# Patient Record
Sex: FEMALE | Race: WHITE | NOT HISPANIC OR LATINO | Employment: OTHER | URBAN - METROPOLITAN AREA
[De-identification: names, ages, dates, MRNs, and addresses within clinical notes are randomized per-mention and may not be internally consistent; named-entity substitution may affect disease eponyms.]

---

## 2020-03-13 ENCOUNTER — HOSPITAL ENCOUNTER (EMERGENCY)
Facility: HOSPITAL | Age: 50
Discharge: HOME | End: 2020-03-13
Attending: EMERGENCY MEDICINE

## 2020-03-13 VITALS
HEART RATE: 78 BPM | SYSTOLIC BLOOD PRESSURE: 122 MMHG | WEIGHT: 160 LBS | BODY MASS INDEX: 25.71 KG/M2 | TEMPERATURE: 98 F | HEIGHT: 66 IN | OXYGEN SATURATION: 99 % | RESPIRATION RATE: 20 BRPM | DIASTOLIC BLOOD PRESSURE: 65 MMHG

## 2020-03-13 DIAGNOSIS — R00.2 PALPITATIONS: ICD-10-CM

## 2020-03-13 DIAGNOSIS — R07.9 CHEST PAIN: ICD-10-CM

## 2020-03-13 DIAGNOSIS — R42 DIZZINESS: ICD-10-CM

## 2020-03-13 DIAGNOSIS — E87.5 HYPERKALEMIA: Primary | ICD-10-CM

## 2020-03-13 LAB
ALBUMIN SERPL BCP-MCNC: 4 G/DL (ref 3.5–5.2)
ALP SERPL-CCNC: 77 U/L (ref 55–135)
ALT SERPL W/O P-5'-P-CCNC: 17 U/L (ref 10–44)
ANION GAP SERPL CALC-SCNC: 13 MMOL/L (ref 8–16)
AST SERPL-CCNC: 17 U/L (ref 10–40)
BASOPHILS # BLD AUTO: 0.02 K/UL (ref 0–0.2)
BASOPHILS NFR BLD: 0.3 % (ref 0–1.9)
BILIRUB SERPL-MCNC: 0.6 MG/DL (ref 0.1–1)
BNP SERPL-MCNC: 10 PG/ML (ref 0–99)
BUN SERPL-MCNC: 12 MG/DL (ref 6–20)
CALCIUM SERPL-MCNC: 9.7 MG/DL (ref 8.7–10.5)
CHLORIDE SERPL-SCNC: 105 MMOL/L (ref 95–110)
CO2 SERPL-SCNC: 21 MMOL/L (ref 23–29)
CREAT SERPL-MCNC: 0.7 MG/DL (ref 0.5–1.4)
DIFFERENTIAL METHOD: ABNORMAL
EOSINOPHIL # BLD AUTO: 0.1 K/UL (ref 0–0.5)
EOSINOPHIL NFR BLD: 0.8 % (ref 0–8)
ERYTHROCYTE [DISTWIDTH] IN BLOOD BY AUTOMATED COUNT: 13.1 % (ref 11.5–14.5)
EST. GFR  (AFRICAN AMERICAN): >60 ML/MIN/1.73 M^2
EST. GFR  (NON AFRICAN AMERICAN): >60 ML/MIN/1.73 M^2
GLUCOSE SERPL-MCNC: 79 MG/DL (ref 70–110)
HCT VFR BLD AUTO: 44.7 % (ref 37–48.5)
HGB BLD-MCNC: 14.7 G/DL (ref 12–16)
IMM GRANULOCYTES # BLD AUTO: 0.01 K/UL (ref 0–0.04)
IMM GRANULOCYTES NFR BLD AUTO: 0.2 % (ref 0–0.5)
LYMPHOCYTES # BLD AUTO: 1.5 K/UL (ref 1–4.8)
LYMPHOCYTES NFR BLD: 23.4 % (ref 18–48)
MCH RBC QN AUTO: 32.2 PG (ref 27–31)
MCHC RBC AUTO-ENTMCNC: 32.9 G/DL (ref 32–36)
MCV RBC AUTO: 98 FL (ref 82–98)
MONOCYTES # BLD AUTO: 0.4 K/UL (ref 0.3–1)
MONOCYTES NFR BLD: 6.6 % (ref 4–15)
NEUTROPHILS # BLD AUTO: 4.5 K/UL (ref 1.8–7.7)
NEUTROPHILS NFR BLD: 68.7 % (ref 38–73)
NRBC BLD-RTO: 0 /100 WBC
PLATELET # BLD AUTO: 250 K/UL (ref 150–350)
PMV BLD AUTO: 10.1 FL (ref 9.2–12.9)
POTASSIUM SERPL-SCNC: 5 MMOL/L (ref 3.5–5.1)
PROT SERPL-MCNC: 8 G/DL (ref 6–8.4)
RBC # BLD AUTO: 4.56 M/UL (ref 4–5.4)
SODIUM SERPL-SCNC: 139 MMOL/L (ref 136–145)
TROPONIN I SERPL DL<=0.01 NG/ML-MCNC: <0.006 NG/ML (ref 0–0.03)
WBC # BLD AUTO: 6.55 K/UL (ref 3.9–12.7)

## 2020-03-13 PROCEDURE — 93005 ELECTROCARDIOGRAM TRACING: CPT

## 2020-03-13 PROCEDURE — 83880 ASSAY OF NATRIURETIC PEPTIDE: CPT

## 2020-03-13 PROCEDURE — 85025 COMPLETE CBC W/AUTO DIFF WBC: CPT

## 2020-03-13 PROCEDURE — 25000003 PHARM REV CODE 250: Performed by: EMERGENCY MEDICINE

## 2020-03-13 PROCEDURE — 80053 COMPREHEN METABOLIC PANEL: CPT

## 2020-03-13 PROCEDURE — 84484 ASSAY OF TROPONIN QUANT: CPT

## 2020-03-13 PROCEDURE — 99284 EMERGENCY DEPT VISIT MOD MDM: CPT | Mod: 25

## 2020-03-13 PROCEDURE — 93010 EKG 12-LEAD: ICD-10-PCS | Mod: ,,, | Performed by: INTERNAL MEDICINE

## 2020-03-13 PROCEDURE — 93010 ELECTROCARDIOGRAM REPORT: CPT | Mod: ,,, | Performed by: INTERNAL MEDICINE

## 2020-03-13 RX ORDER — ACETAMINOPHEN 325 MG/1
325 TABLET ORAL EVERY 6 HOURS PRN
COMMUNITY

## 2020-03-13 RX ORDER — ZINC GLUCONATE 13.3 MG
200 LOZENGE ORAL 4 TIMES DAILY
COMMUNITY

## 2020-03-13 RX ORDER — ASPIRIN 325 MG
325 TABLET ORAL
Status: COMPLETED | OUTPATIENT
Start: 2020-03-13 | End: 2020-03-13

## 2020-03-13 RX ADMIN — ASPIRIN 325 MG ORAL TABLET 325 MG: 325 PILL ORAL at 01:03

## 2020-03-13 NOTE — ED PROVIDER NOTES
Encounter Date: 3/13/2020    SCRIBE #1 NOTE: I, Osvaldo Wynne, am scribing for, and in the presence of,  Syed Vasquez MD. I have scribed the following portions of the note - Other sections scribed: HPI, ROS, PE, MDM.       History     Chief Complaint   Patient presents with    Dizziness     Pt c/o dizziness, HA and palpitations x 2 weeks. Denies F/V/D. No distress noted      Yarelis Farias is a 49 y.o. female who presents to the ED for evaluation of dizziness. Patient states.....        Review of patient's allergies indicates:   Allergen Reactions    Codeine     Penicillins      No past medical history on file.  No past surgical history on file.  No family history on file.  Social History     Tobacco Use    Smoking status: Not on file   Substance Use Topics    Alcohol use: Not on file    Drug use: Not on file     Review of Systems    Physical Exam     Initial Vitals [03/13/20 1306]   BP Pulse Resp Temp SpO2   133/80 69 18 98 °F (36.7 °C) 98 %      MAP       --         Physical Exam    ED Course   Procedures  Labs Reviewed - No data to display       Imaging Results    None                                          Clinical Impression:   {Add your Clinical Impression here. If you haven't documented one yet, please pend the note, finalize a Clinical Impression, and refresh your note before signing.:[id number]}

## 2020-03-13 NOTE — ED NOTES
Called lab to check on lab work lacking.  States 8-10 mins still.  Pt upset and ready to go.  Tried to calm and give encouragement just wants to leave now.  VS stable at this time.

## 2020-03-13 NOTE — ED PROVIDER NOTES
Encounter Date: 3/13/2020       History     Chief Complaint   Patient presents with    Dizziness     Pt c/o dizziness, HA and palpitations x 2 weeks. Denies F/V/D. No distress noted      HPI     49-year-old female with no past medical history presents the ED with sensation of palpitations.  Never took her pulse while this was happening.  She says she is not a very active person is not associated with increased activity.  The sensation of palpitations also associated with intermittent dizziness that only lasts for few seconds. She states she senses that it palpitations with chest pressure that have been happening for the last 2 weeks.  No fevers chills nausea vomiting associated.  No shortness of breath, numbness, tingling or radiating pain.     Review of patient's allergies indicates:   Allergen Reactions    Codeine     Penicillins      History reviewed. No pertinent past medical history.  History reviewed. No pertinent surgical history.  History reviewed. No pertinent family history.  Social History     Tobacco Use    Smoking status: Current Every Day Smoker     Packs/day: 1.00     Types: Cigarettes    Smokeless tobacco: Current User   Substance Use Topics    Alcohol use: Not Currently    Drug use: Not on file     Review of Systems   Constitutional: Negative for fever.   HENT: Negative for sore throat.    Respiratory: Negative for shortness of breath and wheezing.    Cardiovascular: Positive for palpitations. Negative for chest pain and leg swelling.   Gastrointestinal: Negative for abdominal pain and nausea.   Genitourinary: Negative for dysuria.   Musculoskeletal: Negative for back pain.   Skin: Negative for rash.   Neurological: Positive for dizziness. Negative for weakness, numbness and headaches.   Hematological: Does not bruise/bleed easily.       Physical Exam     Initial Vitals [03/13/20 1306]   BP Pulse Resp Temp SpO2   133/80 69 18 98 °F (36.7 °C) 98 %      MAP       --         Physical  Exam    Constitutional: She appears well-developed and well-nourished.   HENT:   Head: Normocephalic and atraumatic.   Mouth/Throat: Oropharynx is clear and moist.   Eyes: Conjunctivae are normal. No scleral icterus.   Neck: Normal range of motion. Neck supple.   Cardiovascular: Normal heart sounds and intact distal pulses.   Pulmonary/Chest: Breath sounds normal. No respiratory distress.   Abdominal: Soft. She exhibits no distension. There is no tenderness.   Musculoskeletal: Normal range of motion. She exhibits no edema or tenderness.   Neurological: She is alert and oriented to person, place, and time.   Skin: Skin is warm and dry.   Psychiatric: She has a normal mood and affect.       EKG:    Normal sinus rhythm at a rate of 69  QRS complexes, p waves and T waves appear to have normal morphology  SC and QT intervals are within normal limits   No abnormal T-wave inversions noted  ED Course   Procedures  Labs Reviewed   CBC W/ AUTO DIFFERENTIAL - Abnormal; Notable for the following components:       Result Value    Mean Corpuscular Hemoglobin 32.2 (*)     All other components within normal limits   COMPREHENSIVE METABOLIC PANEL - Abnormal; Notable for the following components:    CO2 21 (*)     All other components within normal limits   TROPONIN I   B-TYPE NATRIURETIC PEPTIDE   TROPONIN I          Imaging Results    None         49-year-old female with no past medical history presents with sensation of palpitations associated with dizziness that self-resolved.  This has been going on for 2 weeks.  My differential includes arrhythmia, ACS, electrolyte imbalance, dehydration, anxiety.  PE also considered but do not suspect as she is not tachycardic, tachypneic, having chest pain or short of breath.  Patient's describes 1 episode 2 weeks ago of numbness around her mouth that resolved but has not happened again sent.  No other stroke-like symptoms.  Labs acquired EKG.  EKG is normal sinus rhythm.  Labs are overall  reassuring.  Curiously her potassium is on the higher side of normal.  Patient states that she does not think she eat high potassium foods but will keep an eye on it.   I feel that 1 troponin should be adequate as she has been having symptoms for 2 weeks, but offered a 2nd troponin.  Patient refused stating that she feels fine and would like to go home. Patient will follow-up with a PCP/[place] information was given.  Also gave a referral for Cardiology as she may be requiring further investigation to evaluate for arrhythmias.                                     Clinical Impression:       ICD-10-CM ICD-9-CM   1. Hyperkalemia E87.5 276.7   2. Chest pain R07.9 786.50   3. Dizziness R42 780.4   4. Palpitations R00.2 785.1             ED Disposition Condition    Discharge Stable        ED Prescriptions     None        Follow-up Information    None                                    Antonia Silva MD  03/13/20 1726       Antonia Silva MD  03/13/20 5033

## 2020-03-13 NOTE — ED TRIAGE NOTES
Came in with c/o dizziness and lightheadedness for several weeks, heart palpitations.  Dr. Silva present.  PCN and Codeine allergy.

## 2020-03-13 NOTE — ED NOTES
IV lock removed, coban applied.  Instructions went over with patient.  Patient just states she is hungry ready to go.  Upset the she had to wait.  Stable at discharge.

## 2020-03-13 NOTE — ED NOTES
Patient placed on continuous cardiac monitor, automatic blood pressure cuff and continuous pulse oximeter. SR 68

## 2022-07-01 ENCOUNTER — CLINICAL SUPPORT (OUTPATIENT)
Dept: SMOKING CESSATION | Facility: CLINIC | Age: 52
End: 2022-07-01
Payer: COMMERCIAL

## 2022-07-01 DIAGNOSIS — F17.200 NICOTINE DEPENDENCE: Primary | ICD-10-CM

## 2022-07-01 PROCEDURE — 90837 PR PSYCHOTHERAPY W/PATIENT, 60 MIN: ICD-10-PCS | Mod: S$GLB,,,

## 2022-07-01 PROCEDURE — 90837 PSYTX W PT 60 MINUTES: CPT | Mod: S$GLB,,,

## 2022-07-01 PROCEDURE — 99999 PR PBB SHADOW E&M-EST. PATIENT-LVL I: ICD-10-PCS | Mod: PBBFAC,,,

## 2022-07-01 PROCEDURE — 99999 PR PBB SHADOW E&M-EST. PATIENT-LVL I: CPT | Mod: PBBFAC,,,

## 2022-07-01 RX ORDER — IBUPROFEN 200 MG
1 TABLET ORAL DAILY
Qty: 14 PATCH | Refills: 0 | Status: SHIPPED | OUTPATIENT
Start: 2022-07-01 | End: 2022-07-13

## 2022-07-01 RX ORDER — MICONAZOLE NITRATE 2 %
2 CREAM (GRAM) TOPICAL
Qty: 100 EACH | Refills: 0 | Status: SHIPPED | OUTPATIENT
Start: 2022-07-01 | End: 2022-07-02

## 2022-07-01 NOTE — Clinical Note
PATIENT PRESENTS FOR INTAKE BEING A NON SMOKER SINCE GOING COLD [place] ON wEDNESDAY. SHE HAS HAD SOME WITHDRAWAL SYMPTOMS, THEREFORE RECOMMEND THE 21MG NICOTINE PATCH DAILY AND THE 2MG NICOTINE GUM, STRATEGIES AND SESSION HANDOUT DISCUSSED. WILL FOLLOW

## 2022-07-13 ENCOUNTER — CLINICAL SUPPORT (OUTPATIENT)
Dept: SMOKING CESSATION | Facility: CLINIC | Age: 52
End: 2022-07-13
Payer: COMMERCIAL

## 2022-07-13 DIAGNOSIS — F17.200 NICOTINE DEPENDENCE: ICD-10-CM

## 2022-07-13 PROCEDURE — 99402 PR PREVENT COUNSEL,INDIV,30 MIN: ICD-10-PCS | Mod: S$GLB,,,

## 2022-07-13 PROCEDURE — 99402 PREV MED CNSL INDIV APPRX 30: CPT | Mod: S$GLB,,,

## 2022-07-13 PROCEDURE — 99999 PR PBB SHADOW E&M-EST. PATIENT-LVL II: CPT | Mod: PBBFAC,,,

## 2022-07-13 PROCEDURE — 99999 PR PBB SHADOW E&M-EST. PATIENT-LVL II: ICD-10-PCS | Mod: PBBFAC,,,

## 2022-07-13 NOTE — PROGRESS NOTES
Individual Follow-Up Form    7/13/2022    Quit Date: June 29    Clinical Status of Patient: Outpatient    Length of Service: 30 minutes    Continuing Medication: no    Other Medications: N/A     Target Symptoms: Withdrawal and medication side effects. The following were  rated moderate (3) to severe (4) on TCRS:  · Moderate (3): 0  · Severe (4): 0    Comments: PATIENT PRESENTS FOR FOLLOW UP AS A NONSMOKER, SHE QUIT SMOKING ON 6/29, SHE WAS UNABLE TO WEAR THE NICOTINE PATCH DUE TO NAUSEA AND DID NOT LIKE THE NICOTINE GUM ,SHE IS DETERMINED TO QUIT SMOKING, STRATEGIES AND SESSION HANDOUT DISCUSSED, ENCOURAGEMENT PROVIDED, WILL FOLLOW PRN    Diagnosis: F17.200    Next Visit: 2 weeks

## 2022-07-13 NOTE — Clinical Note
Comments: PATIENT PRESENTS FOR FOLLOW UP AS A NONSMOKER, SHE QUIT SMOKING ON 6/29, SHE WAS UNABLE TO WEAR THE NICOTINE PATCH DUE TO NAUSEA AND DID NOT LIKE THE NICOTINE GUM ,SHE IS DETERMINED TO QUIT SMOKING, STRATEGIES AND SESSION HANDOUT DISCUSSED, ENCOURAGEMENT PROVIDED, WILL FOLLOW PRN

## 2022-07-27 ENCOUNTER — CLINICAL SUPPORT (OUTPATIENT)
Dept: SMOKING CESSATION | Facility: CLINIC | Age: 52
End: 2022-07-27
Payer: COMMERCIAL

## 2022-07-27 DIAGNOSIS — F17.200 NICOTINE DEPENDENCE: Primary | ICD-10-CM

## 2022-07-27 PROCEDURE — 99999 PR PBB SHADOW E&M-EST. PATIENT-LVL II: CPT | Mod: PBBFAC,,,

## 2022-07-27 PROCEDURE — 99402 PR PREVENT COUNSEL,INDIV,30 MIN: ICD-10-PCS | Mod: S$GLB,,,

## 2022-07-27 PROCEDURE — 99402 PREV MED CNSL INDIV APPRX 30: CPT | Mod: S$GLB,,,

## 2022-07-27 PROCEDURE — 99999 PR PBB SHADOW E&M-EST. PATIENT-LVL II: ICD-10-PCS | Mod: PBBFAC,,,

## 2022-07-27 NOTE — Clinical Note
Comments: PATIENT PRESENTS FOR FOLLOW UP AS A CONTINUED NON SMOKER,  SHE QUIT SMOKING ON 6/29 AFTER HER SCC[facility], PRESCRIBED TOBACCO CESSATION NRT HOWEVER PATIENT HAS NEVER USED. SHE WAS MENTALLY DETERMINED WHEN SHE WALKED IN TO THE OFFICE THAT SHE WOULD BE QUITTING SMOKING AND NOT RETURNING TO IT. ENCOURAGED HER TO CONTINUE THIS DETERMINATION, STRATEGIES TO PREVENT LAPSE DISCUSSED, WILL FOLLOW

## 2022-07-27 NOTE — PROGRESS NOTES
Individual Follow-Up Form    7/27/2022    Quit Date: 6/29    Clinical Status of Patient: Outpatient    Length of Service: 30 minutes    Continuing Medication: no    Other Medications: N/A     Target Symptoms: Withdrawal and medication side effects. The following were  rated moderate (3) to severe (4) on TCRS:  · Moderate (3): 0  · Severe (4): 0    Comments: PATIENT PRESENTS FOR FOLLOW UP AS A CONTINUED NON SMOKER,  SHE QUIT SMOKING ON 6/29 AFTER HER SCCON, PRESCRIBED TOBACCO CESSATION NRT HOWEVER PATIENT HAS NEVER USED. SHE WAS MENTALLY DETERMINED WHEN SHE WALKED IN TO THE OFFICE THAT SHE WOULD BE QUITTING SMOKING AND NOT RETURNING TO IT. ENCOURAGED HER TO CONTINUE THIS DETERMINATION, STRATEGIES TO PREVENT LAPSE DISCUSSED, WILL FOLLOW     Diagnosis: F17.210    Next Visit: 2 weeks

## 2022-10-17 ENCOUNTER — CLINICAL SUPPORT (OUTPATIENT)
Dept: SMOKING CESSATION | Facility: CLINIC | Age: 52
End: 2022-10-17
Payer: COMMERCIAL

## 2022-10-17 DIAGNOSIS — F17.200 NICOTINE DEPENDENCE: Primary | ICD-10-CM

## 2022-10-17 PROCEDURE — 99407 BEHAV CHNG SMOKING > 10 MIN: CPT | Mod: S$GLB,,,

## 2022-10-17 PROCEDURE — 99999 PR PBB SHADOW E&M-EST. PATIENT-LVL I: CPT | Mod: PBBFAC,,,

## 2022-10-17 PROCEDURE — 99999 PR PBB SHADOW E&M-EST. PATIENT-LVL I: ICD-10-PCS | Mod: PBBFAC,,,

## 2022-10-17 PROCEDURE — 99407 PR TOBACCO USE CESSATION INTENSIVE >10 MINUTES: ICD-10-PCS | Mod: S$GLB,,,

## 2022-10-17 NOTE — PROGRESS NOTES
Spoke with patient today in regard to smoking cessation progress for 3 month telephone follow up, she states not tobacco free. Patient states she had a lot going on and not ready to return to the program at this time, but will call back at a later date and time. Informed patient of benefit period, future follow ups, and contact information if any further help or support is needed. Will complete smart form for 3 month follow up on Quit attempt #1.

## 2023-01-05 ENCOUNTER — CLINICAL SUPPORT (OUTPATIENT)
Dept: SMOKING CESSATION | Facility: CLINIC | Age: 53
End: 2023-01-05
Payer: COMMERCIAL

## 2023-01-05 DIAGNOSIS — F17.200 NICOTINE DEPENDENCE: Primary | ICD-10-CM

## 2023-01-05 PROCEDURE — 99407 PR TOBACCO USE CESSATION INTENSIVE >10 MINUTES: ICD-10-PCS | Mod: S$GLB,,, | Performed by: FAMILY MEDICINE

## 2023-01-05 PROCEDURE — 99407 BEHAV CHNG SMOKING > 10 MIN: CPT | Mod: S$GLB,,, | Performed by: FAMILY MEDICINE

## 2023-01-05 NOTE — PROGRESS NOTES
Spoke with pt for 6 mo telephone f/u. Pt is not tobacco free. Pt not ready to rejoin program at this time. Confirmed contact information should she decide to rejoin in the future.

## 2023-07-10 ENCOUNTER — CLINICAL SUPPORT (OUTPATIENT)
Dept: SMOKING CESSATION | Facility: CLINIC | Age: 53
End: 2023-07-10
Payer: COMMERCIAL

## 2023-07-10 DIAGNOSIS — F17.200 NICOTINE DEPENDENCE, UNCOMPLICATED: Primary | ICD-10-CM

## 2023-07-10 PROCEDURE — 99999 PR PBB SHADOW E&M-EST. PATIENT-LVL I: ICD-10-PCS | Mod: PBBFAC,,,

## 2023-07-10 PROCEDURE — 99999 PR PBB SHADOW E&M-EST. PATIENT-LVL I: CPT | Mod: PBBFAC,,,

## 2023-07-10 NOTE — PROGRESS NOTES
Spoke with patient today in regard to smoking cessation progress for 12 month follow up. She states she is not tobacco free. Patient is still smoking and she requested that her name be removed from call list. Informed patient of benefit period, future follow ups and contact information if any further help is needed. Will resolve smart form for 12 month follow up for Quit # 1.

## 2023-12-26 ENCOUNTER — TELEPHONE (OUTPATIENT)
Dept: UROLOGY | Facility: CLINIC | Age: 53
End: 2023-12-26
Payer: COMMERCIAL

## 2023-12-26 NOTE — TELEPHONE ENCOUNTER
----- Message from Laurel Sams sent at 12/26/2023 10:44 AM CST -----  Type:  Needs Medical Advice    Who Called: pt  Symptoms (please be specific): pt states that a referral was sent October 4th from Dr Mendoza resending it needs to get in right away please call to reschedule    Would the patient rather a call back or a response via [facility]? call  Best Call Back Number:  [phone]  Additional Information:

## 2024-01-05 ENCOUNTER — TELEPHONE (OUTPATIENT)
Dept: UROLOGY | Facility: CLINIC | Age: 54
End: 2024-01-05
Payer: MEDICAID

## 2024-01-05 NOTE — TELEPHONE ENCOUNTER
----- Message from Britni Whiting sent at 1/5/2024  4:02 PM CST -----  .Type:  Patient Returning Call    Who Called: pt  Who Left Message for Patient: missy  Does the patient know what this is regarding?: can not get records from hospital  Would the patient rather a call back or a response via [facility]?   Best Call Back Number: [phone]  Additional Information: appt is for Monday

## 2024-01-05 NOTE — TELEPHONE ENCOUNTER
Spoke with pt and advised for her to just bring in report and provider will probably suggest new imaging to be completed at visit. Pt voiced her understanding

## 2024-01-08 ENCOUNTER — OFFICE VISIT (OUTPATIENT)
Dept: UROLOGY | Facility: CLINIC | Age: 54
End: 2024-01-08
Payer: MEDICAID

## 2024-01-08 VITALS
BODY MASS INDEX: 25.05 KG/M2 | HEIGHT: 66 IN | SYSTOLIC BLOOD PRESSURE: 110 MMHG | WEIGHT: 155.88 LBS | DIASTOLIC BLOOD PRESSURE: 75 MMHG | HEART RATE: 85 BPM

## 2024-01-08 DIAGNOSIS — N22 CALCULUS OF URINARY TRACT IN DISEASES CLASSIFIED ELSEWHERE: ICD-10-CM

## 2024-01-08 DIAGNOSIS — N20.0 KIDNEY STONES: Primary | ICD-10-CM

## 2024-01-08 PROCEDURE — 99213 OFFICE O/P EST LOW 20 MIN: CPT | Mod: PBBFAC,PO | Performed by: UROLOGY

## 2024-01-08 PROCEDURE — 99999 PR PBB SHADOW E&M-EST. PATIENT-LVL III: CPT | Mod: PBBFAC,,, | Performed by: UROLOGY

## 2024-01-08 PROCEDURE — 3008F BODY MASS INDEX DOCD: CPT | Mod: CPTII,,, | Performed by: UROLOGY

## 2024-01-08 PROCEDURE — 3078F DIAST BP <80 MM HG: CPT | Mod: CPTII,,, | Performed by: UROLOGY

## 2024-01-08 PROCEDURE — 99204 OFFICE O/P NEW MOD 45 MIN: CPT | Mod: S$PBB,,, | Performed by: UROLOGY

## 2024-01-08 PROCEDURE — 1159F MED LIST DOCD IN RCRD: CPT | Mod: CPTII,,, | Performed by: UROLOGY

## 2024-01-08 PROCEDURE — 3074F SYST BP LT 130 MM HG: CPT | Mod: CPTII,,, | Performed by: UROLOGY

## 2024-01-08 RX ORDER — ALBUTEROL SULFATE 90 UG/1
2 AEROSOL, METERED RESPIRATORY (INHALATION) EVERY 6 HOURS PRN
COMMUNITY
Start: 2023-11-30 | End: 2024-11-29

## 2024-01-08 RX ORDER — OMEPRAZOLE 20 MG/1
20 CAPSULE, DELAYED RELEASE ORAL EVERY MORNING
COMMUNITY

## 2024-01-08 NOTE — PROGRESS NOTES
Subjective:       Patient ID: Yarelis Farias is a 53 y.o. female.    Chief Complaint: kidney stones     This is a 53 y.o.  female patient that is new to me.  The patient was referred to me by ED attending for kidney stones. Patient reports left sided flank pain x3 weeks ago while [place] state accompanied by urethral pain, nausea and vomiting. Went to [facility] where diagnosed with kidney stones. Denies hematuria and sysruia at the time. Reports symptoms have since resolved and states she passed the stone.   CT 12/22/2023 with and without contrast showed 2 mm left ureteral obstructing stone, small stones located in bilateral kidneys, exophytic lesions to left kidney that were too small to measure.   Denies history of kidney stones. Denies family history of kidney and bladder cancer. Reports 1 pack per day smoker.        Lab Results   Component Value Date    CREATININE 0.73 04/05/2023       ---  PMH/PSH/Medications/Allergies/Social history reviewed and as in chart.    Review of Systems   Constitutional:  Negative for chills and fever.   Respiratory:  Negative for shortness of breath.    Cardiovascular:  Negative for chest pain and palpitations.   Gastrointestinal:  Negative for abdominal pain, constipation and diarrhea.   Genitourinary:  Negative for difficulty urinating, dysuria, flank pain, frequency, hematuria, pelvic pain, urgency and vaginal pain.   Neurological:  Negative for dizziness and weakness.   Psychiatric/Behavioral:  Negative for agitation, confusion and sleep disturbance.      Objective:      Physical Exam  HENT:      Head: Normocephalic.   Pulmonary:      Effort: Pulmonary effort is normal.   Abdominal:      General: Abdomen is flat.      Palpations: Abdomen is soft.   Musculoskeletal:         General: Normal range of motion.      Cervical back: Normal range of motion.   Skin:     General: Skin is warm and dry.   Neurological:      Mental Status: She is alert and oriented to person, place,  and time.       Assessment:     Problem Noted   Kidney Stones 1/8/2024       Plan:     Scheduled CT scan without contrast in one month  Encouraged to drink 2L of water per day, decrease sodium intake, smoking cessation.   Follow-up 1 month with CT results    NICK Perera    The above referenced imaging and interpretations were personally reviewed.  Disclaimer: This note has been generated using voice-recognition software. There may be typographical errors that have been missed during proof-reading.

## 2024-01-09 ENCOUNTER — TELEPHONE (OUTPATIENT)
Dept: UROLOGY | Facility: CLINIC | Age: 54
End: 2024-01-09
Payer: MEDICAID

## 2024-01-09 NOTE — TELEPHONE ENCOUNTER
----- Message from Catina Lynch sent at 1/8/2024  3:46 PM CST -----  Pt Requesting Call Back    Who called: pt  Who called for pt:  Best call back #:  [phone]  Add notes: pt said it's in regards of where to get her medical records sent to (coming from a different hospital)

## 2024-01-09 NOTE — TELEPHONE ENCOUNTER
Spoke with pt and provider her with office number and address for outside clinic/hospital to send over disc. I advised to pt that we would reach out once we received disk. Pt voiced her understanding

## 2024-01-22 ENCOUNTER — HOSPITAL ENCOUNTER (OUTPATIENT)
Dept: RADIOLOGY | Facility: HOSPITAL | Age: 54
Discharge: HOME | End: 2024-01-22
Payer: MEDICAID

## 2024-01-22 DIAGNOSIS — N22 CALCULUS OF URINARY TRACT IN DISEASES CLASSIFIED ELSEWHERE: ICD-10-CM

## 2024-01-22 PROCEDURE — 74176 CT ABD & PELVIS W/O CONTRAST: CPT | Mod: 26,,, | Performed by: RADIOLOGY

## 2024-01-22 PROCEDURE — 74176 CT ABD & PELVIS W/O CONTRAST: CPT | Mod: TC

## 2024-02-01 ENCOUNTER — OFFICE VISIT (OUTPATIENT)
Dept: UROLOGY | Facility: CLINIC | Age: 54
End: 2024-02-01
Payer: MEDICAID

## 2024-02-01 VITALS
BODY MASS INDEX: 25.58 KG/M2 | SYSTOLIC BLOOD PRESSURE: 132 MMHG | HEART RATE: 70 BPM | WEIGHT: 158.5 LBS | DIASTOLIC BLOOD PRESSURE: 78 MMHG

## 2024-02-01 DIAGNOSIS — N20.1 LEFT URETERAL STONE: ICD-10-CM

## 2024-02-01 DIAGNOSIS — N20.0 KIDNEY STONES: Primary | ICD-10-CM

## 2024-02-01 PROCEDURE — 3075F SYST BP GE 130 - 139MM HG: CPT | Mod: CPTII,,, | Performed by: UROLOGY

## 2024-02-01 PROCEDURE — 99213 OFFICE O/P EST LOW 20 MIN: CPT | Mod: PBBFAC,PO | Performed by: UROLOGY

## 2024-02-01 PROCEDURE — 3008F BODY MASS INDEX DOCD: CPT | Mod: CPTII,,, | Performed by: UROLOGY

## 2024-02-01 PROCEDURE — 3078F DIAST BP <80 MM HG: CPT | Mod: CPTII,,, | Performed by: UROLOGY

## 2024-02-01 PROCEDURE — 99214 OFFICE O/P EST MOD 30 MIN: CPT | Mod: S$PBB,,, | Performed by: UROLOGY

## 2024-02-01 PROCEDURE — 99999 PR PBB SHADOW E&M-EST. PATIENT-LVL III: CPT | Mod: PBBFAC,,, | Performed by: UROLOGY

## 2024-02-01 PROCEDURE — 1159F MED LIST DOCD IN RCRD: CPT | Mod: CPTII,,, | Performed by: UROLOGY

## 2024-02-01 NOTE — PROGRESS NOTES
[facility] - Urology   Clinic Note    SUBJECTIVE:     Chief Complaint   Patient presents with    Other     CT results       Referral from: No ref. provider found.    History of Present Illness:  Yarelis Farias is a 53 y.o. female who presents to clinic for history of renal stones.    This is a 53 y.o.  female patient that is new to me.  The patient was referred to me by ED attending for kidney stones. Patient reports left sided flank pain x3 weeks ago while out of state accompanied by urethral pain, nausea and vomiting. Went to [facility] where diagnosed with kidney stones. Denies hematuria and sysruia at the time. Reports symptoms have since resolved and states she passed the stone.   CT 2023 with and without contrast showed 2 mm left ureteral obstructing stone, small stones located in bilateral kidneys, exophytic lesions to left kidney that were too small to measure.   Denies history of kidney stones. Denies family history of kidney and bladder cancer. Reports 1 pack per day smoker.    2024  Here to review imaging  Ct with small non-obstructing renal stones, no ureteral calc or hydro    Patient endorses no additional complaints at this time.    No past medical history on file.    No past surgical history on file.    No family history on file.    Social History     Tobacco Use    Smoking status: Former     Current packs/day: 0.00     Types: Cigarettes     Quit date: 2022     Years since quittin.5    Smokeless tobacco: Current   Substance Use Topics    Alcohol use: Not Currently       Current Outpatient Medications on File Prior to Visit   Medication Sig Dispense Refill    acetaminophen (TYLENOL) 325 MG tablet Take 325 mg by mouth every 6 (six) hours as needed for Pain.      albuterol (PROVENTIL/VENTOLIN HFA) 90 mcg/actuation inhaler Inhale 2 puffs into the lungs every 6 (six) hours as needed.      omeprazole (PRILOSEC) 20 MG capsule Take 20 mg by mouth every morning.      cimetidine  "(TAGAMET) 200 MG tablet Take 200 mg by mouth 4 (four) times daily.       No current facility-administered medications on file prior to visit.       Review of patient's allergies indicates:   Allergen Reactions    Trazodone Palpitations    Codeine     Penicillins        Review of Systems:  A review of 10+ systems was conducted with pertinent positive and negative findings documented in HPI with all other systems reviewed and negative.    OBJECTIVE:     Estimated body mass index is 25.58 kg/m² as calculated from the following:    Height as of 1/8/24: 5' 6" (1.676 m).    Weight as of this encounter: 71.9 kg (158 lb 8.2 oz).    Vital Signs (Most Recent)  Vitals:    02/01/24 0911   BP: 132/78   Pulse: 70       Physical Exam:  GENERAL: patient sitting comfortably  HEENT: normocephalic  NECK: supple, no JVD  PULM: normal chest rise, no increased WOB  HEART: non-diaphoretic  ABDO: soft, nondistended, nontender  BACK: no CVA tenderness bilaterally  SKIN: warm, dry, well perfused  EXT: no bruising or edema  NEURO: grossly normal with no focal deficits  PSYCH: appropriate mood and affect    Genitourinary Exam:  deferred    Lab Results   Component Value Date    BUN 10.7 04/05/2023    CREATININE 0.73 04/05/2023    WBC 6.55 03/13/2020    HGB 14.7 03/13/2020    HCT 44.7 03/13/2020     03/13/2020    AST 19 04/05/2023    ALT 33 04/05/2023    ALKPHOS 77 03/13/2020    ALBUMIN 4.2 04/05/2023        Imaging:  I have personally reviewed all relevant imaging studies.    Results for orders placed or performed during the hospital encounter of 01/22/24 (from the past 2160 hour(s))   CT Renal Stone Study ABD Pelvis WO    Narrative    EXAMINATION:  CT RENAL STONE STUDY ABD PELVIS WO    CLINICAL HISTORY:  Nephrolithiasis, uncomplicated; Calculus of urinary tract in diseases classified elsewhere    TECHNIQUE:  Low dose axial images, sagittal and coronal reformations were obtained from the lung bases to the pubic symphysis.  Contrast was " not administered.    COMPARISON:  None    FINDINGS:  There are mild dependent atelectatic changes within the lung bases.  No pleural effusions are identified.  Bony structures appear intact.  There is no evidence for acute fracture or bone destruction.    The liver is mildly enlarged.  There are subcentimeter hypodensities identified within the liver, too small to adequately characterize.  The gallbladder is present and appears unremarkable.  No biliary dilatation is appreciated on this noncontrast examination.  The spleen, stomach, and pancreas all appear grossly unremarkable.  The adrenal glands are not enlarged.  There are bilateral nonobstructing renal calculi present.  There are 3 calculi identified on the right with the largest measuring 3 mm in size.  There is a single calculus identified on the left measuring 4 mm in size.  There are subcentimeter hyperdense exophytic nodules on the left, likely hyperdense cysts although too small to adequately characterize.  There is no evidence for hydronephrosis.  There is atherosclerotic calcification present within the abdominal aorta which tapers normally without aneurysmal dilatation.  No para-aortic lymphadenopathy is identified.  There are a few colonic diverticula present without evidence for acute diverticulitis.  The uterus is surgically absent.  No abnormal adnexal masses are appreciated.  The urinary bladder appears unremarkable.  There are surgical changes involving the rectum no ascites is identified.  There is a tiny fat containing supraumbilical anterior abdominal wall hernia approximately 2 cm above the umbilicus and there is also a small fat containing umbilical hernia.  There is no evidence for pelvic or inguinal lymphadenopathy.  There is no evidence for ascites.      Impression    Mild hepatomegaly.    Subcentimeter hepatic hypodensities, too small to adequately characterize.    Bilateral nonobstructing renal calculi.    Subcentimeter hyperdense  exophytic left renal nodules, possibly hyperdense cysts.    Colonic diverticulosis.    S/p hysterectomy.    Fat containing umbilical and midline supraumbilical abdominal wall hernias.      Electronically signed by: Ayden Cheng MD  Date:    01/22/2024  Time:    10:00     No results found for this or any previous visit (from the past 2160 hour(s)).  CT Renal Stone Study ABD Pelvis WO  Narrative: EXAMINATION:  CT RENAL STONE STUDY ABD PELVIS WO    CLINICAL HISTORY:  Nephrolithiasis, uncomplicated; Calculus of urinary tract in diseases classified elsewhere    TECHNIQUE:  Low dose axial images, sagittal and coronal reformations were obtained from the lung bases to the pubic symphysis.  Contrast was not administered.    COMPARISON:  None    FINDINGS:  There are mild dependent atelectatic changes within the lung bases.  No pleural effusions are identified.  Bony structures appear intact.  There is no evidence for acute fracture or bone destruction.    The liver is mildly enlarged.  There are subcentimeter hypodensities identified within the liver, too small to adequately characterize.  The gallbladder is present and appears unremarkable.  No biliary dilatation is appreciated on this noncontrast examination.  The spleen, stomach, and pancreas all appear grossly unremarkable.  The adrenal glands are not enlarged.  There are bilateral nonobstructing renal calculi present.  There are 3 calculi identified on the right with the largest measuring 3 mm in size.  There is a single calculus identified on the left measuring 4 mm in size.  There are subcentimeter hyperdense exophytic nodules on the left, likely hyperdense cysts although too small to adequately characterize.  There is no evidence for hydronephrosis.  There is atherosclerotic calcification present within the abdominal aorta which tapers normally without aneurysmal dilatation.  No para-aortic lymphadenopathy is identified.  There are a few colonic diverticula present  "without evidence for acute diverticulitis.  The uterus is surgically absent.  No abnormal adnexal masses are appreciated.  The urinary bladder appears unremarkable.  There are surgical changes involving the rectum no ascites is identified.  There is a tiny fat containing supraumbilical anterior abdominal wall hernia approximately 2 cm above the umbilicus and there is also a small fat containing umbilical hernia.  There is no evidence for pelvic or inguinal lymphadenopathy.  There is no evidence for ascites.  Impression: Mild hepatomegaly.    Subcentimeter hepatic hypodensities, too small to adequately characterize.    Bilateral nonobstructing renal calculi.    Subcentimeter hyperdense exophytic left renal nodules, possibly hyperdense cysts.    Colonic diverticulosis.    S/p hysterectomy.    Fat containing umbilical and midline supraumbilical abdominal wall hernias.    Electronically signed by: Ayden Cheng MD  Date:    01/22/2024  Time:    10:00       PSA:  No results found for: "PSA", "PSADIAG", "PSATOTAL", "PSAFREE"    Testosterone:  No results found for: "TOTALTESTOST", "TESTOSTERONE"     ASSESSMENT     1. Kidney stones    2. Left ureteral stone        PLAN:     CT reviewed. Options discussed, including observation vs ureteroscopy with laser litho  Patient was very interested in ureteroscopy however I explained that these stones are small and possibly not even in the collecting system. Patient understands  Will continue to observe with KUB and JOHNY in 6 months  Cysts appear to be hyperdense cysts and not c/w any complex process, will observe on JOHNY    Ze Goyal MD  Urology  [facility]    Disclaimer: This note has been generated using voice-recognition software. There may be typographical errors that have been missed during proof-reading.     "

## 2024-07-31 ENCOUNTER — HOSPITAL ENCOUNTER (OUTPATIENT)
Dept: RADIOLOGY | Facility: HOSPITAL | Age: 54
Discharge: HOME | End: 2024-07-31
Attending: UROLOGY
Payer: MEDICAID

## 2024-07-31 DIAGNOSIS — N20.1 LEFT URETERAL STONE: ICD-10-CM

## 2024-07-31 DIAGNOSIS — N20.0 KIDNEY STONES: ICD-10-CM

## 2024-07-31 PROCEDURE — 76770 US EXAM ABDO BACK WALL COMP: CPT | Mod: 26,,, | Performed by: RADIOLOGY

## 2024-07-31 PROCEDURE — 76770 US EXAM ABDO BACK WALL COMP: CPT | Mod: TC

## 2024-07-31 PROCEDURE — 74018 RADEX ABDOMEN 1 VIEW: CPT | Mod: TC,FY

## 2024-07-31 PROCEDURE — 74018 RADEX ABDOMEN 1 VIEW: CPT | Mod: 26,,, | Performed by: RADIOLOGY

## 2024-08-01 ENCOUNTER — OFFICE VISIT (OUTPATIENT)
Dept: UROLOGY | Facility: CLINIC | Age: 54
End: 2024-08-01
Payer: MEDICAID

## 2024-08-01 VITALS
SYSTOLIC BLOOD PRESSURE: 122 MMHG | BODY MASS INDEX: 24.94 KG/M2 | HEIGHT: 66 IN | DIASTOLIC BLOOD PRESSURE: 83 MMHG | HEART RATE: 79 BPM | WEIGHT: 155.19 LBS

## 2024-08-01 DIAGNOSIS — N28.1 RENAL CYST: ICD-10-CM

## 2024-08-01 DIAGNOSIS — N20.0 KIDNEY STONES: Primary | ICD-10-CM

## 2024-08-01 PROCEDURE — 99213 OFFICE O/P EST LOW 20 MIN: CPT | Mod: PBBFAC,PO | Performed by: UROLOGY

## 2024-08-01 PROCEDURE — 3079F DIAST BP 80-89 MM HG: CPT | Mod: CPTII,,, | Performed by: UROLOGY

## 2024-08-01 PROCEDURE — 3008F BODY MASS INDEX DOCD: CPT | Mod: CPTII,,, | Performed by: UROLOGY

## 2024-08-01 PROCEDURE — 99214 OFFICE O/P EST MOD 30 MIN: CPT | Mod: S$PBB,,, | Performed by: UROLOGY

## 2024-08-01 PROCEDURE — 99999 PR PBB SHADOW E&M-EST. PATIENT-LVL III: CPT | Mod: PBBFAC,,, | Performed by: UROLOGY

## 2024-08-01 PROCEDURE — 1159F MED LIST DOCD IN RCRD: CPT | Mod: CPTII,,, | Performed by: UROLOGY

## 2024-08-01 PROCEDURE — 3074F SYST BP LT 130 MM HG: CPT | Mod: CPTII,,, | Performed by: UROLOGY

## 2024-08-01 RX ORDER — BENZONATATE 100 MG/1
100 CAPSULE ORAL 3 TIMES DAILY PRN
COMMUNITY

## 2024-08-01 RX ORDER — IBUPROFEN 800 MG/1
1 TABLET ORAL EVERY 8 HOURS PRN
COMMUNITY
Start: 2024-07-10

## 2024-08-01 RX ORDER — PANTOPRAZOLE SODIUM 40 MG/1
40 TABLET, DELAYED RELEASE ORAL DAILY
COMMUNITY
Start: 2024-06-27

## 2024-08-01 NOTE — PROGRESS NOTES
[facility] - [facility]rology   Clinic Note    SUBJECTIVE:     Chief Complaint   Patient presents with    Nephrolithiasis    Follow-up       Referral from: No ref. provider found.    History of Present Illness:  Yarelis Farias is a 54 y.o. female who presents to clinic for history of renal stones.    This is a 53 y.o.  female patient that is new to me.  The patient was referred to me by ED attending for kidney stones. Patient reports left sided flank pain x3 weeks ago while out of state accompanied by urethral pain, nausea and vomiting. Went to [facility] where diagnosed with kidney stones. Denies hematuria and sysruia at the time. Reports symptoms have since resolved and states she passed the stone.   CT 2023 with and without contrast showed 2 mm left ureteral obstructing stone, small stones located in bilateral kidneys, exophytic lesions to left kidney that were too small to measure.   Denies history of kidney stones. Denies family history of kidney and bladder cancer. Reports 1 pack per day smoker.    2024  Here to review imaging  Ct with small non-obstructing renal stones, no ureteral calc or hydro    2024  Here to review 6 mo imaging  Has several small stones bilat  No hydro  Nothing obviously visible on KUB    Patient endorses no additional complaints at this time.    History reviewed. No pertinent past medical history.    History reviewed. No pertinent surgical history.    No family history on file.    Social History     Tobacco Use    Smoking status: Former     Current packs/day: 0.00     Types: Cigarettes     Quit date: 2022     Years since quittin.0    Smokeless tobacco: Current   Substance Use Topics    Alcohol use: Not Currently       Current Outpatient Medications on File Prior to Visit   Medication Sig Dispense Refill    albuterol (PROVENTIL/VENTOLIN HFA) 90 mcg/actuation inhaler Inhale 2 puffs into the lungs every 6 (six) hours as needed.      benzonatate (TESSALON) 100 MG  "capsule Take 100 mg by mouth 3 (three) times daily as needed.      cimetidine (TAGAMET) 200 MG tablet Take 200 mg by mouth 4 (four) times daily.      ibuprofen (ADVIL,MOTRIN) 800 MG tablet Take 1 tablet by mouth every 8 (eight) hours as needed.      omeprazole (PRILOSEC) 20 MG capsule Take 20 mg by mouth every morning.      pantoprazole (PROTONIX) 40 MG tablet Take 40 mg by mouth once daily.      acetaminophen (TYLENOL) 325 MG tablet Take 325 mg by mouth every 6 (six) hours as needed for Pain. (Patient not taking: Reported on 8/1/2024)       No current facility-administered medications on file prior to visit.       Review of patient's allergies indicates:   Allergen Reactions    Trazodone Palpitations    Codeine     Penicillins        Review of Systems:  A review of 10+ systems was conducted with pertinent positive and negative findings documented in HPI with all other systems reviewed and negative.    OBJECTIVE:     Estimated body mass index is 25.05 kg/m² as calculated from the following:    Height as of this encounter: 5' 6" (1.676 m).    Weight as of this encounter: 70.4 kg (155 lb 3.3 oz).    Vital Signs (Most Recent)  Vitals:    08/01/24 1007   BP: 122/83   Pulse: 79       Physical Exam:  GENERAL: patient sitting comfortably  HEENT: normocephalic  NECK: supple, no JVD  PULM: normal chest rise, no increased WOB  HEART: non-diaphoretic  ABDO: soft, nondistended, nontender  BACK: no CVA tenderness bilaterally  SKIN: warm, dry, well perfused  EXT: no bruising or edema  NEURO: grossly normal with no focal deficits  PSYCH: appropriate mood and affect    Genitourinary Exam:  deferred    Lab Results   Component Value Date    BUN 10.7 04/05/2023    CREATININE 0.73 04/05/2023    WBC 6.55 03/13/2020    HGB 14.7 03/13/2020    HCT 44.7 03/13/2020     03/13/2020    AST 19 04/05/2023    ALT 33 04/05/2023    ALKPHOS 62 09/22/2022    ALBUMIN 4.2 04/05/2023        Imaging:  I have personally reviewed all relevant imaging " "studies.    No results found for this or any previous visit (from the past 2160 hour(s)).    No results found for this or any previous visit (from the past 2160 hour(s)).  US Retroperitoneal Complete  Narrative: EXAMINATION:  US RETROPERITONEAL COMPLETE    CLINICAL HISTORY:  Calculus of kidney    TECHNIQUE:  Ultrasound of the kidneys and urinary bladder was performed including color flow and Doppler evaluation of the kidneys.    COMPARISON:  CT 01/22/2024.    FINDINGS:  Right kidney: The right kidney measures 11.9 cm. No cortical thinning. No loss of corticomedullary distinction. Resistive index measures 0.64.  No mass. 7 mm echogenic focus in the interpolar kidney.  No hydronephrosis.    Left kidney: The left kidney measures 11.8 cm. No cortical thinning. No loss of corticomedullary distinction. Resistive index measures 0.52.  No mass.  Upper pole cyst 1.4 cm.  Echogenic foci in the lower pole measures 0.8 cm and 0.6 cm, respectively.  No hydronephrosis.    The bladder is partially distended at the time of scanning and has an unremarkable appearance.  Impression: Bilateral nonobstructing nephrolithiasis.    Electronically signed by: Joe Su MD  Date:    07/31/2024  Time:    11:16  X-Ray KUB  Narrative: EXAMINATION:  XR KUB    CLINICAL HISTORY:  Calculus of kidney    TECHNIQUE:  Single AP supine view of the abdomen (KUB) was performed    COMPARISON:  CT renal stone study dated 01/22/2024    FINDINGS:  Single punctate focus projecting at the expected bilateral renal shadow level, possible small stones (noting partial obscuration by bowel).  No definite calculus projecting at the expected course of the ureters.  Few intrapelvic phleboliths.  Unremarkable bowel gas pattern.  Lung bases are clear.  Impression: As above.    Electronically signed by: Colt Omer  Date:    07/31/2024  Time:    09:47       PSA:  No results found for: "PSA", "PSADIAG", "PSATOTAL", "PSAFREE"    Testosterone:  No results found " "for: "TOTALTESTOST", "TESTOSTERONE"     ASSESSMENT     1. Kidney stones    2. Renal cyst        PLAN:     Imaging reviewed. Options discussed, including observation vs ureteroscopy with laser litho  Pt would like to continue to observe  Plan for KUB and JOHNY in 1 year  Cysts appear simple, no fu needed    Ze Goyal MD  Urology  [facility]    Disclaimer: This note has been generated using voice-recognition software. There may be typographical errors that have been missed during proof-reading.       "